# Patient Record
Sex: FEMALE | Race: WHITE | HISPANIC OR LATINO | URBAN - METROPOLITAN AREA
[De-identification: names, ages, dates, MRNs, and addresses within clinical notes are randomized per-mention and may not be internally consistent; named-entity substitution may affect disease eponyms.]

---

## 2022-07-06 ENCOUNTER — OFFICE VISIT (OUTPATIENT)
Dept: URBAN - METROPOLITAN AREA CLINIC 60 | Facility: CLINIC | Age: 42
End: 2022-07-06
Payer: COMMERCIAL

## 2022-07-06 DIAGNOSIS — Q12.0 CONGENITAL CATARACT: Primary | ICD-10-CM

## 2022-07-06 PROCEDURE — 92004 COMPRE OPH EXAM NEW PT 1/>: CPT | Performed by: OPTOMETRIST

## 2022-07-06 ASSESSMENT — KERATOMETRY
OS: 42.73
OD: 42.80

## 2022-07-06 ASSESSMENT — INTRAOCULAR PRESSURE
OS: 14
OD: 14

## 2022-07-06 NOTE — IMPRESSION/PLAN
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 No treatment currently recommended due to level of vision. Patient will monitor vision changes and contact us with any decrease in vision.

## 2023-07-07 ENCOUNTER — OFFICE VISIT (OUTPATIENT)
Dept: URBAN - METROPOLITAN AREA CLINIC 60 | Facility: CLINIC | Age: 43
End: 2023-07-07
Payer: COMMERCIAL

## 2023-07-07 DIAGNOSIS — Q12.0 CONGENITAL CATARACT: Primary | ICD-10-CM

## 2023-07-07 PROCEDURE — 92014 COMPRE OPH EXAM EST PT 1/>: CPT | Performed by: OPTOMETRIST

## 2023-07-07 RX ORDER — LEVOTHYROXINE SODIUM 88 UG/1
TABLET ORAL
Qty: 0 | Refills: 0 | Status: ACTIVE
Start: 2023-07-07

## 2023-07-07 ASSESSMENT — INTRAOCULAR PRESSURE
OS: 18
OD: 18

## 2023-07-07 ASSESSMENT — VISUAL ACUITY
OS: 20/20
OD: 20/20